# Patient Record
Sex: FEMALE | Race: WHITE | Employment: OTHER | URBAN - METROPOLITAN AREA
[De-identification: names, ages, dates, MRNs, and addresses within clinical notes are randomized per-mention and may not be internally consistent; named-entity substitution may affect disease eponyms.]

---

## 2018-09-14 ENCOUNTER — HOSPITAL ENCOUNTER (OUTPATIENT)
Dept: PHYSICAL THERAPY | Age: 58
Discharge: HOME | End: 2018-09-14
Payer: COMMERCIAL

## 2018-09-14 PROCEDURE — 97110 THERAPEUTIC EXERCISES: CPT

## 2018-09-14 PROCEDURE — 97161 PT EVAL LOW COMPLEX 20 MIN: CPT

## 2018-09-14 PROCEDURE — 97530 THERAPEUTIC ACTIVITIES: CPT

## 2018-09-14 NOTE — PROGRESS NOTES
[facility] at [address] [place] [address]  Phone: [phone]   Fax: [phone]    Plan of Care/ Statement of Necessity for Physical Therapy Services     Patient name: Samantha Leach Start of Care: 2018   Referral source: Alicia Little MD : 1960    Medical Diagnosis: Low back pain [M54.5]   Onset Date:2018    Treatment Diagnosis: lumbar radiculopathy    Prior Hospitalization: see medical history Provider#: [id number]   Medications: Verified on Patient summary List    Comorbidities: unremarkable   Prior Level of Function: walking everyday, not fearful to bend forward     The Plan of Care and following information is based on the information from the initial evaluation. Assessment/ key information: Pt is a 63yo female presenting to therapy with c/c right LE radicular symptoms and left hip pain starting 2018 after twisting to move heavy pillows on bed. Pt has had right LE symptoms ongoing for 2 years down into foot with insidious onset. Pt reporting symptoms increasing with prolonged sitting and bending forward. Pt demonstrates non antalgic gait, decreased lumbar flexion ROM due to pain, decreased hip flexion and glut strength, symmetrical pelvis, positive SLR bilaterally with pain felt on left, and extension biased with decreased radicular symptoms. Sings and symptoms consistent with lumbar radiculopathy.  Pt would benefit from skilled PT services to address the above impairments to complete functional daily activities with less symptoms  Evaluation Complexity History LOW Complexity : Zero comorbidities / personal factors that will impact the outcome / POC; Examination HIGH Complexity : 4+ Standardized tests and measures addressing body structure, function, activity limitation and / or participation in recreation  ;Presentation LOW Complexity : Stable, uncomplicated  ;Clinical Decision Making MEDIUM Complexity : FOTO score of 26-74  Overall Complexity Rating: LOW   Problem List: pain affecting function, decrease ROM, decrease strength, impaired gait/ balance, decrease ADL/ functional abilitiies, decrease activity tolerance and decrease flexibility/ joint mobility   Treatment Plan may include any combination of the following: Therapeutic exercise, Therapeutic activities, Neuromuscular re-education, Physical agent/modality, Gait/balance training, Patient education, Self Care training and Functional mobility training  Patient / Family readiness to learn indicated by: asking questions, trying to perform skills and interest  Persons(s) to be included in education: patient (P)  Barriers to Learning/Limitations: None  Patient Goal (s): Releive pain in my back and my legs to feel good again. Patient Self Reported Health Status: good  Rehabilitation Potential: good    Short Term Goals: STG- To be accomplished in 2 week(s):  1. Pt will be independent with HEP to encourage prophylaxis. Eval:HEP dispensed  Current: NA     Long Term Goals: LTG- To be accomplished in 4 week(s):  1. Pt will report >75% reduction in symptoms to complete ADLs with increased ease. Eval:7/10 max pain, right LE radicular symptoms down to foot   Current: NA     2.  Pt lumbar flexion ROM will improve to at least 5in from floor to demonstrate improved tolerance to bending forward to  objects  Eval:10in from floor, pain  Current: NA     3.  Pt glut and hip flexor strength will improve to >/=4+/5 to allow pt to manage stairs with increased ease. Eval:gluts 3+/5, hip flexor 4/5   Current: NA     4. Pt FOTO score will increase by >/=12 points to show improvement in subjective function. Eval:60  Current: will address at visit 5    Frequency / Duration: Patient to be seen 2 times per week for 4 weeks.     Patient/ Caregiver education and instruction: Diagnosis, prognosis, self care, activity modification and exercises   [x]  Plan of care has been reviewed with MATHEUS Lanier Chema 9/14/2018 10:03 AM  _____________________________________________________________________  I certify that the above Therapy Services are being furnished while the patient is under my care. I agree with the treatment plan and certify that this therapy is necessary.     Physician's Signature:____________Date:_________TIME:________    ** Signature, Date and Time must be completed for valid certification **    Please sign and return to [facility] at [address] [place] [address]  Phone: [phone]   Fax: [phone]

## 2018-09-14 NOTE — PROGRESS NOTES
PT DAILY TREATMENT NOTE/LUMBAR EVAL 3-16    Patient Name: Rayshawn Rabago  Date:2018  : 1960  [x]  Patient  Verified  Payor: Deepti Delgado / [facility] Type: Commer[facility] /    In time:9:11  Out time:9:50  Total Treatment Time (min): 39  Visit #: 1 of 8    Treatment Area: Low back pain [M54.5]  SUBJECTIVE  Pain Level (0-10 scale): 2  []constant []intermittent []improving []worsening []no change since onset    Any medication changes, allergies to medications, adverse drug reactions, diagnosis change, or new procedure performed?: [x] No    [] Yes (see summary sheet for update)  Subjective functional status/changes:     PLOF: walking everyday outside, garden, swimming  Limitations to PLOF: cautious of picking up things such as groceries. Mechanism of Injury: right sciatica pain started 2 years ago. Making bed and moving pillow from top to base of bed and felt sharp pain in left hip 2018. Injections with no relief with most recent in July both sides. Current symptoms/Complaints: Pain increases to 7/10 with sitting, standing up, stairs, bending forward, vacumming. Pain decreases to 2/10 with walking, moving around. Describes pain as throbbing pain in left hip and down into right LE. C/o tingling in right LE  With sitting for long periods. MRI report with bulgding disc on right with L5/S1 F/u with MD in December. Previous Treatment/Compliance: None   PMHx/Surgical Hx: unremarkable  Work Hx: retired from [address] [place] [address] Street: 1 story house with stairs to bonus rooms  Pt Goals: \"Releive pain in my back and my legs to feel good again.        OBJECTIVE/EXAMINATION      19 min [x]Eval                  []Re-Eval       10 min Therapeutic Exercise:  [x] See flow sheet : standing extension, prone press ups, clams figure 4 stretch    Rationale: increase ROM and increase strength to improve the patients ability to perform daily activities with decreased pain and symptom levels With   [] TE   [x] TA-10' [] neuro   [] other: Patient Education: [x] Review HEP    [] Progressed/Changed HEP based on:   [x] positioning   [x] body mechanics   [] transfers   [] heat/ice application    [x] other: Pt education on diagnosis, anatomy, positioning with sitting, exam findings, POC, HEP overview and compliance      Other Objective/Functional Measures: see initial eval     Physical Therapy Evaluation - Lumbar Spine ([facility])       General Health:  Red Flags Indicated? [] Yes    [x] No  [] Yes [] No Recent weight change (If yes, due to dieting?  [] Yes  [] No)   [] Yes [] No Weakness in legs during walking  [] Yes [] No Unremitting pain at night  [] Yes [] No Abdominal pain or problems  [] Yes [] No Rectal bleeding  [] Yes [] No Feet more cold or painful in cold weather  [] Yes [] No Menstrual irregularities  [] Yes [] No Blood or pain with urination  [] Yes [] No Dysfunction of bowel or bladder  [] Yes [] No Recent illness within past 3 weeks (i.e, cold, flu)  [] Yes [] No Numbness/tingling in buttock/genitalia region    OBJECTIVE  Posture:  Lateral Shift: [] R    [] L     [] +  [x] -  Kyphosis: [] Increased [] Decreased   []  WNL  Lordosis:  [] Increased [] Decreased   [] WNL  Pelvic symmetry: [x] WNL    [] Other:    Gait:  [x] Normal     [] Abnormal:    Active Movements: [] N/A   [] Too acute   [] Other:  ROM % AROM % PROM Comments:pain, area   Forward flexion 40-60 25cm from floor      Extension 20-30 100     SB right 20-30 100     SB left 20-30 100     Rotation right 5-10 75     Rotation left 5-10 75       Repeated Movements   Effects on present pain: produces (OK), abolishes (A), increases (incr), decreases (decr), centralizes (C), peripheral (PH), no effect (NE)   Pre-Test Sx Flexion Repeated Flexion Extension Repeated Extension Repeated SBL Repeated SBR   Sitting          Standing          Lying      N/A N/A   Comments: extension biased, decreaing numbness in right LE   Side Glide:  Sustained passive positioning test:    Neuro Screen [] WNL  Myotome/Dermatome/Reflexes:  Comments:    Dural Mobility:  SLR Sitting: [] R    [] L    [] +    [] -  @ (degrees):           Supine: [x] R    [x] L    [x] +    [] -  @ (degrees):   Slump Test: [] R    [] L    [] +    [] -  @ (degrees):   Prone Knee Bend: [] R    [] L    [] +    [] -     Palpation  [] Min  [] Mod  [] Severe    Location:  [] Min  [] Mod  [] Severe    Location:  [] Min  [] Mod  [] Severe    Location:    Strength   L(0-5) R (0-5) N/T   Hip Flexion (L1,2) 4 4 []   Knee Extension (L3,4) 5 5 []   Ankle Dorsiflexion (L4) 5 5 []   Great Toe Extension (L5)   []   Ankle Plantarflexion (S1) 5 5 []   Knee Flexion (S1,2) 5 5 []   Upper Abdominals   []   Lower Abdominals   []   Paraspinals   []   Back Rotators   []   Gluteus Oc 3+ 3+ []   Other   []     Special Tests  Lumbar:  Lumb. Compression: [] Pos  [] Neg               Lumbar Distraction:   [] Pos  [] Neg    Quadrant:  [] Pos  [] Neg   [] Flex  [] Ext    Sacroilliac:  Gaenslen's: [] R    [] L    [] +    [] -     Compression: [] +    [] -     Gapping:  [] +    [] -     Thigh Thrust: [] R    [] L    [] +    [] -     Leg Length: [] +    [] -   Position:    Crests:    ASIS:    PSIS:    Sacral Sulcus:    Mobility: Standing flex:     Sitting flex:     Supine to sit:     Prone knee bend:         Hip: Eros Hurter:  [] R    [] L    [] +    [x] -     Scour:  [] R    [] L    [] +    [x] -     Piriformis: [] R    [] L    [] +    [x] -          Deficits: Jojo's: [] R    [] L    [] +    [] -     Ruben: [] R    [] L    [] +    [] -     Hamstrings 90/90: negative     Gastrocsoleus (to neutral): Right: Left:           Other tests/comments:  Good SL balance bilaterally     Pain Level (0-10 scale) post treatment: 2    ASSESSMENT/Changes in Function: Pt is a 63yo female presenting to therapy with c/c right LE radicular symptoms and left hip pain starting February 2018 after twisting to move heavy pillows on bed.  Pt has had right LE symptoms ongoing for 2 years down into foot with insidious onset. Pt reporting symptoms increasing with prolonged sitting and bending forward. Pt demonstrates non antalgic gait, decreased lumbar flexion ROM due to pain, decreased hip flexion and glut strength, symmetrical pelvis, positive SLR bilaterally with pain felt on left, and extension biased with decreased radicular symptoms. Sings and symptoms consistent with lumbar radiculopathy. Pt would benefit from skilled PT services to address the above impairments to complete functional daily activities with less symptoms. Patient will continue to benefit from skilled PT services to modify and progress therapeutic interventions, address functional mobility deficits, address ROM deficits, address strength deficits, analyze and cue movement patterns, analyze and modify body mechanics/ergonomics, assess and modify postural abnormalities and instruct in home and community integration to attain remaining goals. []  See Plan of Care  []  See progress note/recertification  []  See Discharge Summary         Progress towards goals / Updated goals:  Short Term Goals: STG- To be accomplished in 2 week(s):  1. Pt will be independent with HEP to encourage prophylaxis. Eval:HEP dispensed  Current: NA    Long Term Goals: LTG- To be accomplished in 4 week(s):  1. Pt will report >75% reduction in symptoms to complete ADLs with increased ease. Eval:7/10 max pain, right LE radicular symptoms down to foot   Current: NA    2. Pt lumbar flexion ROM will improve to at least 5in from floor to demonstrate improved tolerance to bending forward to  objects  Eval:10in from floor, pain  Current: NA    3. Pt glut and hip flexor strength will improve to >/=4+/5 to allow pt to manage stairs with increased ease. Eval:gluts 3+/5, hip flexor 4/5   Current: NA    4. Pt FOTO score will increase by >/=12 points to show improvement in subjective function.   Eval:60  Current: will address at visit 5      PLAN  []  Upgrade activities as tolerated     [x]  Continue plan of care  []  Update interventions per flow sheet       []  Discharge due to:_  []  Other:_      Chester Hein 9/14/2018  9:11 AM

## 2018-09-18 ENCOUNTER — HOSPITAL ENCOUNTER (OUTPATIENT)
Dept: PHYSICAL THERAPY | Age: 58
Discharge: HOME | End: 2018-09-18
Payer: COMMERCIAL

## 2018-09-18 PROCEDURE — 97110 THERAPEUTIC EXERCISES: CPT

## 2018-09-18 NOTE — PROGRESS NOTES
PT DAILY TREATMENT NOTE     Patient Name: Leonardo Reyes  Date:2018  : 1960  [x]  Patient  Verified  Payor: Melissa Zhao / Plan: [facility]  [facility] / Product Type: Commerical /    In time:12:00  Out time:12:40  Total Treatment Time (min): 40  Visit #: 2 of 8    Treatment Area: Low back pain [M54.5]    SUBJECTIVE  Pain Level (0-10 scale): 4-5  Any medication changes, allergies to medications, adverse drug reactions, diagnosis change, or new procedure performed?: [x] No    [] Yes (see summary sheet for update)  Subjective functional status/changes:   [] No changes reported  \"Its sore today\"     OBJECTIVE    Modality rationale: decrease pain to improve the patients ability to perform pain free ADL's    Min Type Additional Details    [] Estim:  []Unatt       []IFC  []Premod                        []Other:  []w/ice   []w/heat  Position:  Location:    [] Estim: []Att    []TENS instruct  []NMES                    []Other:  []w/US   []w/ice   []w/heat  Position:  Location:    []  Traction: [] Cervical       []Lumbar                       [] Prone          []Supine                       []Intermittent   []Continuous Lbs:  [] before manual  [] after manual    []  Ultrasound: []Continuous   [] Pulsed                           []1MHz   []3MHz W/cm2:  Location:    []  Iontophoresis with dexamethasone         Location: [] Take home patch   [] In clinic   10 []  Ice     [x]  heat  []  Ice massage  []  Laser   []  Anodyne Position: supine with LE elevation   Location: Low back     []  Laser with stim  []  Other:  Position:  Location:    []  Vasopneumatic Device Pressure:       [] lo [] med [] hi   Temperature: [] lo [] med [] hi   [x] Skin assessment post-treatment:  [x]intact []redness- no adverse reaction    []redness - adverse reaction:       30 min Therapeutic Exercise:  [x] See flow sheet :   Rationale: increase ROM, increase strength, improve coordination, improve balance and increase proprioception to improve the patients ability to perform pain free ADL's           With   [] TE   [] TA   [] neuro   [] other: Patient Education: [x] Review HEP    [] Progressed/Changed HEP based on:   [] positioning   [] body mechanics   [] transfers   [] heat/ice application    [] other:      Other Objective/Functional Measures:   Difficulty with hip hinge  Forward weight shift with squats. Pain Level (0-10 scale) post treatment: 0-1    ASSESSMENT/Changes in Function: Pt reported compliance with HEP since initial eval.  Demonstrated good understanding and performance of all exercises this session. Appropriately challenged. Noted difficulty with hip hinging and weight shifting into heels with squatting. Plan to progress therex for glut and core strengthening as tolerated. Patient will continue to benefit from skilled PT services to modify and progress therapeutic interventions, address functional mobility deficits, address ROM deficits, address strength deficits, analyze and address soft tissue restrictions, analyze and cue movement patterns, analyze and modify body mechanics/ergonomics, assess and modify postural abnormalities and address imbalance/dizziness to attain remaining goals. []  See Plan of Care  []  See progress note/recertification  []  See Discharge Summary         Progress towards goals / Updated goals:  Short Term Goals: STG- To be accomplished in 2 week(s):  1. Pt will be independent with HEP to encourage prophylaxis. Eval:HEP dispensed  Current: progressing per pt report of compliance      Long Term Goals: LTG- To be accomplished in 4 week(s):  1. Pt will report >75% reduction in symptoms to complete ADLs with increased ease.   Eval:7/10 max pain, right LE radicular symptoms down to foot   Current: NA     2.  Pt lumbar flexion ROM will improve to at least 5in from floor to demonstrate improved tolerance to bending forward to  objects  Eval:10in from floor, pain  Current: NA     3.  Pt glut and hip flexor strength will improve to >/=4+/5 to allow pt to manage stairs with increased ease. Eval:gluts 3+/5, hip flexor 4/5   Current: NA     4. Pt FOTO score will increase by >/=12 points to show improvement in subjective function. Eval:60  Current: will address at visit 5       PLAN  [x]  Upgrade activities as tolerated     [x]  Continue plan of care  []  Update interventions per flow sheet       []  Discharge due to:_  []  Other:_      [facility] 9/18/2018  12:39 PM    No future appointments.

## 2018-09-24 ENCOUNTER — HOSPITAL ENCOUNTER (OUTPATIENT)
Dept: PHYSICAL THERAPY | Age: 58
Discharge: HOME | End: 2018-09-24
Payer: COMMERCIAL

## 2018-09-24 PROCEDURE — 97530 THERAPEUTIC ACTIVITIES: CPT | Performed by: PHYSICAL THERAPIST

## 2018-09-24 PROCEDURE — 97110 THERAPEUTIC EXERCISES: CPT | Performed by: PHYSICAL THERAPIST

## 2018-09-24 NOTE — PROGRESS NOTES
PT DAILY TREATMENT NOTE 12    Patient Name: Amalia Walker  Date:2018  : 1960  [x]  Patient  Verified  Payor: Unknown Ades / Plan: [facility] Product Type: Com[facility] /    In time:9:28  Out time:10:21  Total Treatment Time (min): 49  Visit #: 3 of 8    Treatment Area: Low back pain [M54.5]    SUBJECTIVE  Pain Level (0-10 scale): 2-3  Any medication changes, allergies to medications, adverse drug reactions, diagnosis change, or new procedure performed?: [x] No    [] Yes (see summary sheet for update)  Subjective functional status/changes:   [] No changes reported  Feels like it's getting better    OBJECTIVE    30 min Therapeutic Exercise:  [x] See flow sheet :   Rationale: increase ROM, increase strength and decrease pain to improve the patients ability to complete ADLs    19 min Therapeutic Activity:  [x]  See flow sheet :   Rationale: increase ROM, increase strength and improve coordination  to improve the patients ability to complete ADLs         With   [] TE   [] TA   [] neuro   [] other: Patient Education: [x] Review HEP    [] Progressed/Changed HEP based on:   [] positioning   [] body mechanics   [] transfers   [] heat/ice application    [] other:      Other Objective/Functional Measures:      Pain Level (0-10 scale) post treatment: 2-3    ASSESSMENT/Changes in Function: Patient responds well to treatment session. Advised to perform lumbar ext or piriformis stretch 2x10, 3 times a day. Then perform PRN on top of that. Was cued. No adverse effects were noted from today's treatment session      Patient will continue to benefit from skilled PT services to modify and progress therapeutic interventions, address functional mobility deficits, address ROM deficits, address strength deficits, analyze and address soft tissue restrictions, analyze and cue movement patterns, analyze and modify body mechanics/ergonomics and assess and modify postural abnormalities to attain remaining goals. []  See Plan of Care  []  See progress note/recertification  []  See Discharge Summary         Progress towards goals / Updated goals:  Short Term Goals: STG- To be accomplished in 2 week(s):  1.  Pt will be independent with HEP to encourage prophylaxis. Eval:HEP dispensed  Current: progressing per pt report of compliance       Long Term Goals: LTG- To be accomplished in 4 week(s):  1.  Pt will report >75% reduction in symptoms to complete ADLs with increased ease. Eval:7/10 max pain, right LE radicular symptoms down to foot   Current: NA      2.  Pt lumbar flexion ROM will improve to at least 5in from floor to demonstrate improved tolerance to bending forward to  objects  Eval:10in from floor, pain  Current: NA      3.  Pt glut and hip flexor strength will improve to >/=4+/5 to allow pt to manage stairs with increased ease. Eval:gluts 3+/5, hip flexor 4/5   Current: NA      4.  Pt FOTO score will increase by >/=12 points to show improvement in subjective function. Eval:60  Current: will address at visit 5    PLAN  []  Upgrade activities as tolerated     [x]  Continue plan of care  []  Update interventions per flow sheet       []  Discharge due to:_  []  Other:_      Daryl Owen, PT, DPT 9/24/2018  9:31 AM    No future appointments.

## 2022-03-17 NOTE — PROGRESS NOTES
[facility] at [address] [place] [address] Drive: [phone]   Fax: [phone]  Discharge Summary     Patient name: Brendon Hein Start of Care: 2018   Referral source: Valeria Diamond MD : 1960               Medical Diagnosis: Low back pain [M54.5]    Onset Date:2018               Treatment Diagnosis: lumbar radiculopathy    Prior Hospitalization: see medical history Provider#: [id number]   Medications: Verified on Patient summary List   ===========================================================================================  Assessment / Summary of Care:  Brendon Hein is a 64 y.o.   female who attended only three of eight planned PT visits for management of low back pain. Patient was beginning to note reduced pain intensity with PT exercise program, but patient did not contact clinic for further treatment after third session. Below is patient progress towards goals at last attended session.    ===========================================================================================    Plan: Discharge to independent HEP. Progress Towards Goals:     Short Term Goals: STG- To be accomplished in 2 week(s):  1.  Pt will be independent with HEP to encourage prophylaxis. Eval:HEP dispensed  Current: progressing per pt report of compliance       Long Term Goals: LTG- To be accomplished in 4 week(s):  1.  Pt will report >75% reduction in symptoms to complete ADLs with increased ease. Eval:7/10 max pain, right LE radicular symptoms down to foot   Current: NA      2.  Pt lumbar flexion ROM will improve to at least 5in from floor to demonstrate improved tolerance to bending forward to  objects  Eval:10in from floor, pain  Current: NA      3.  Pt glut and hip flexor strength will improve to >/=4+/5 to allow pt to manage stairs with increased ease.   Eval:gluts 3+/5, hip flexor 4/5   Current: NA      4.  Pt FOTO score will increase by >/=12 points to show improvement in subjective function.   Eval:60  Current: will address at visit 5    ===========================================================================================    Therapist Signature: Julia Koo PT, DPT Date: 3/17/2022     Time: 3:30 PM